# Patient Record
Sex: MALE | Race: WHITE | NOT HISPANIC OR LATINO | Employment: OTHER | URBAN - METROPOLITAN AREA
[De-identification: names, ages, dates, MRNs, and addresses within clinical notes are randomized per-mention and may not be internally consistent; named-entity substitution may affect disease eponyms.]

---

## 2022-10-22 ENCOUNTER — HOSPITAL ENCOUNTER (INPATIENT)
Facility: MEDICAL CENTER | Age: 87
LOS: 1 days | DRG: 291 | End: 2022-10-24
Attending: EMERGENCY MEDICINE | Admitting: STUDENT IN AN ORGANIZED HEALTH CARE EDUCATION/TRAINING PROGRAM
Payer: MEDICARE

## 2022-10-22 ENCOUNTER — APPOINTMENT (OUTPATIENT)
Dept: RADIOLOGY | Facility: MEDICAL CENTER | Age: 87
DRG: 291 | End: 2022-10-22
Attending: EMERGENCY MEDICINE
Payer: MEDICARE

## 2022-10-22 DIAGNOSIS — I50.23 SYSTOLIC CHF, ACUTE ON CHRONIC (HCC): ICD-10-CM

## 2022-10-22 DIAGNOSIS — R06.00 PND (PAROXYSMAL NOCTURNAL DYSPNEA): ICD-10-CM

## 2022-10-22 DIAGNOSIS — R06.01 ORTHOPNEA: ICD-10-CM

## 2022-10-22 DIAGNOSIS — R06.02 SOB (SHORTNESS OF BREATH): ICD-10-CM

## 2022-10-22 PROBLEM — I48.91 A-FIB (HCC): Status: ACTIVE | Noted: 2022-10-22

## 2022-10-22 PROBLEM — N17.9 AKI (ACUTE KIDNEY INJURY) (HCC): Status: ACTIVE | Noted: 2022-10-22

## 2022-10-22 PROBLEM — Z95.0 PACEMAKER: Status: ACTIVE | Noted: 2022-10-22

## 2022-10-22 PROBLEM — R79.89 TROPONIN I ABOVE REFERENCE RANGE: Status: ACTIVE | Noted: 2022-10-22

## 2022-10-22 PROBLEM — J96.01 ACUTE RESPIRATORY FAILURE WITH HYPOXIA (HCC): Status: ACTIVE | Noted: 2022-10-22

## 2022-10-22 LAB
ANION GAP SERPL CALC-SCNC: 16 MMOL/L (ref 7–16)
BASOPHILS # BLD AUTO: 0.9 % (ref 0–1.8)
BASOPHILS # BLD: 0.07 K/UL (ref 0–0.12)
BUN SERPL-MCNC: 24 MG/DL (ref 8–22)
CALCIUM SERPL-MCNC: 9.1 MG/DL (ref 8.5–10.5)
CHLORIDE SERPL-SCNC: 103 MMOL/L (ref 96–112)
CO2 SERPL-SCNC: 20 MMOL/L (ref 20–33)
CREAT SERPL-MCNC: 1.2 MG/DL (ref 0.5–1.4)
EKG IMPRESSION: NORMAL
EOSINOPHIL # BLD AUTO: 0.11 K/UL (ref 0–0.51)
EOSINOPHIL NFR BLD: 1.5 % (ref 0–6.9)
ERYTHROCYTE [DISTWIDTH] IN BLOOD BY AUTOMATED COUNT: 53 FL (ref 35.9–50)
GFR SERPLBLD CREATININE-BSD FMLA CKD-EPI: 58 ML/MIN/1.73 M 2
GLUCOSE BLD STRIP.AUTO-MCNC: 122 MG/DL (ref 65–99)
GLUCOSE SERPL-MCNC: 133 MG/DL (ref 65–99)
HCT VFR BLD AUTO: 45.5 % (ref 42–52)
HGB BLD-MCNC: 15.4 G/DL (ref 14–18)
IMM GRANULOCYTES # BLD AUTO: 0.02 K/UL (ref 0–0.11)
IMM GRANULOCYTES NFR BLD AUTO: 0.3 % (ref 0–0.9)
LYMPHOCYTES # BLD AUTO: 1.14 K/UL (ref 1–4.8)
LYMPHOCYTES NFR BLD: 15.4 % (ref 22–41)
MCH RBC QN AUTO: 32 PG (ref 27–33)
MCHC RBC AUTO-ENTMCNC: 33.8 G/DL (ref 33.7–35.3)
MCV RBC AUTO: 94.4 FL (ref 81.4–97.8)
MONOCYTES # BLD AUTO: 0.93 K/UL (ref 0–0.85)
MONOCYTES NFR BLD AUTO: 12.6 % (ref 0–13.4)
NEUTROPHILS # BLD AUTO: 5.14 K/UL (ref 1.82–7.42)
NEUTROPHILS NFR BLD: 69.3 % (ref 44–72)
NRBC # BLD AUTO: 0 K/UL
NRBC BLD-RTO: 0 /100 WBC
NT-PROBNP SERPL IA-MCNC: 4667 PG/ML (ref 0–125)
PLATELET # BLD AUTO: 250 K/UL (ref 164–446)
PMV BLD AUTO: 10.2 FL (ref 9–12.9)
POTASSIUM SERPL-SCNC: 4 MMOL/L (ref 3.6–5.5)
RBC # BLD AUTO: 4.82 M/UL (ref 4.7–6.1)
SODIUM SERPL-SCNC: 139 MMOL/L (ref 135–145)
TROPONIN T SERPL-MCNC: 40 NG/L (ref 6–19)
TROPONIN T SERPL-MCNC: 44 NG/L (ref 6–19)
WBC # BLD AUTO: 7.4 K/UL (ref 4.8–10.8)

## 2022-10-22 PROCEDURE — 99285 EMERGENCY DEPT VISIT HI MDM: CPT

## 2022-10-22 PROCEDURE — 84484 ASSAY OF TROPONIN QUANT: CPT

## 2022-10-22 PROCEDURE — 83880 ASSAY OF NATRIURETIC PEPTIDE: CPT

## 2022-10-22 PROCEDURE — A9270 NON-COVERED ITEM OR SERVICE: HCPCS | Performed by: EMERGENCY MEDICINE

## 2022-10-22 PROCEDURE — 71045 X-RAY EXAM CHEST 1 VIEW: CPT

## 2022-10-22 PROCEDURE — G0378 HOSPITAL OBSERVATION PER HR: HCPCS

## 2022-10-22 PROCEDURE — 99220 PR INITIAL OBSERVATION CARE,LEVL III: CPT | Performed by: STUDENT IN AN ORGANIZED HEALTH CARE EDUCATION/TRAINING PROGRAM

## 2022-10-22 PROCEDURE — 85025 COMPLETE CBC W/AUTO DIFF WBC: CPT

## 2022-10-22 PROCEDURE — 700102 HCHG RX REV CODE 250 W/ 637 OVERRIDE(OP): Performed by: STUDENT IN AN ORGANIZED HEALTH CARE EDUCATION/TRAINING PROGRAM

## 2022-10-22 PROCEDURE — 36415 COLL VENOUS BLD VENIPUNCTURE: CPT

## 2022-10-22 PROCEDURE — 80048 BASIC METABOLIC PNL TOTAL CA: CPT

## 2022-10-22 PROCEDURE — 93005 ELECTROCARDIOGRAM TRACING: CPT | Performed by: EMERGENCY MEDICINE

## 2022-10-22 PROCEDURE — 700102 HCHG RX REV CODE 250 W/ 637 OVERRIDE(OP): Performed by: EMERGENCY MEDICINE

## 2022-10-22 PROCEDURE — A9270 NON-COVERED ITEM OR SERVICE: HCPCS | Performed by: STUDENT IN AN ORGANIZED HEALTH CARE EDUCATION/TRAINING PROGRAM

## 2022-10-22 PROCEDURE — 82962 GLUCOSE BLOOD TEST: CPT

## 2022-10-22 PROCEDURE — 93005 ELECTROCARDIOGRAM TRACING: CPT | Performed by: STUDENT IN AN ORGANIZED HEALTH CARE EDUCATION/TRAINING PROGRAM

## 2022-10-22 RX ORDER — BISACODYL 10 MG
10 SUPPOSITORY, RECTAL RECTAL
Status: DISCONTINUED | OUTPATIENT
Start: 2022-10-22 | End: 2022-10-24 | Stop reason: HOSPADM

## 2022-10-22 RX ORDER — POLYETHYLENE GLYCOL 3350 17 G/17G
1 POWDER, FOR SOLUTION ORAL
Status: DISCONTINUED | OUTPATIENT
Start: 2022-10-22 | End: 2022-10-24 | Stop reason: HOSPADM

## 2022-10-22 RX ORDER — LABETALOL HYDROCHLORIDE 5 MG/ML
10 INJECTION, SOLUTION INTRAVENOUS EVERY 4 HOURS PRN
Status: DISCONTINUED | OUTPATIENT
Start: 2022-10-22 | End: 2022-10-24 | Stop reason: HOSPADM

## 2022-10-22 RX ORDER — POTASSIUM CHLORIDE 20 MEQ/1
20 TABLET, EXTENDED RELEASE ORAL DAILY
COMMUNITY

## 2022-10-22 RX ORDER — OCTREOTIDE ACETATE,MI-SPHERES 30 MG
30 VIAL (EA) INTRAMUSCULAR ONCE
COMMUNITY

## 2022-10-22 RX ORDER — ASPIRIN 325 MG
325 TABLET ORAL ONCE
Status: COMPLETED | OUTPATIENT
Start: 2022-10-22 | End: 2022-10-22

## 2022-10-22 RX ORDER — DEXTROSE MONOHYDRATE 25 G/50ML
25 INJECTION, SOLUTION INTRAVENOUS
Status: DISCONTINUED | OUTPATIENT
Start: 2022-10-22 | End: 2022-10-24 | Stop reason: HOSPADM

## 2022-10-22 RX ORDER — SODIUM PHOSPHATE,MONO-DIBASIC 19G-7G/118
500 ENEMA (ML) RECTAL
Status: SHIPPED | COMMUNITY
End: 2022-10-22

## 2022-10-22 RX ORDER — FUROSEMIDE 10 MG/ML
40 INJECTION INTRAMUSCULAR; INTRAVENOUS
Status: DISCONTINUED | OUTPATIENT
Start: 2022-10-22 | End: 2022-10-24

## 2022-10-22 RX ORDER — AMOXICILLIN 250 MG
2 CAPSULE ORAL 2 TIMES DAILY
Status: DISCONTINUED | OUTPATIENT
Start: 2022-10-22 | End: 2022-10-24 | Stop reason: HOSPADM

## 2022-10-22 RX ORDER — FUROSEMIDE 20 MG/1
20 TABLET ORAL DAILY
COMMUNITY

## 2022-10-22 RX ADMIN — ASPIRIN 325 MG: 325 TABLET, FILM COATED ORAL at 10:01

## 2022-10-22 RX ADMIN — APIXABAN 5 MG: 5 TABLET, FILM COATED ORAL at 19:03

## 2022-10-22 ASSESSMENT — PAIN DESCRIPTION - PAIN TYPE
TYPE: ACUTE PAIN
TYPE: ACUTE PAIN

## 2022-10-22 ASSESSMENT — CHA2DS2 SCORE
AGE 65 TO 74: NO
PRIOR STROKE OR TIA OR THROMBOEMBOLISM: NO
HYPERTENSION: YES
VASCULAR DISEASE: YES
DIABETES: NO
CHF OR LEFT VENTRICULAR DYSFUNCTION: YES
AGE 75 OR GREATER: YES
SEX: MALE
CHA2DS2 VASC SCORE: 5

## 2022-10-22 ASSESSMENT — LIFESTYLE VARIABLES
DOES PATIENT WANT TO STOP DRINKING: NO
EVER HAD A DRINK FIRST THING IN THE MORNING TO STEADY YOUR NERVES TO GET RID OF A HANGOVER: NO
ALCOHOL_USE: NO
TOTAL SCORE: 0
HAVE YOU EVER FELT YOU SHOULD CUT DOWN ON YOUR DRINKING: NO
ON A TYPICAL DAY WHEN YOU DRINK ALCOHOL HOW MANY DRINKS DO YOU HAVE: 0
HAVE PEOPLE ANNOYED YOU BY CRITICIZING YOUR DRINKING: NO
TOTAL SCORE: 0
TOTAL SCORE: 0
EVER HAD A DRINK FIRST THING IN THE MORNING TO STEADY YOUR NERVES TO GET RID OF A HANGOVER: NO
ALCOHOL_USE: NO
TOTAL SCORE: 0
ON A TYPICAL DAY WHEN YOU DRINK ALCOHOL HOW MANY DRINKS DO YOU HAVE: 0
HAVE PEOPLE ANNOYED YOU BY CRITICIZING YOUR DRINKING: NO
TOTAL SCORE: 0
CONSUMPTION TOTAL: NEGATIVE
AVERAGE NUMBER OF DAYS PER WEEK YOU HAVE A DRINK CONTAINING ALCOHOL: 0
HAVE YOU EVER FELT YOU SHOULD CUT DOWN ON YOUR DRINKING: NO
DOES PATIENT WANT TO STOP DRINKING: NO
DO YOU DRINK ALCOHOL: NO
HOW MANY TIMES IN THE PAST YEAR HAVE YOU HAD 5 OR MORE DRINKS IN A DAY: 0
EVER FELT BAD OR GUILTY ABOUT YOUR DRINKING: NO
CONSUMPTION TOTAL: INCOMPLETE
TOTAL SCORE: 0
EVER FELT BAD OR GUILTY ABOUT YOUR DRINKING: NO

## 2022-10-22 ASSESSMENT — PATIENT HEALTH QUESTIONNAIRE - PHQ9
SUM OF ALL RESPONSES TO PHQ9 QUESTIONS 1 AND 2: 0
2. FEELING DOWN, DEPRESSED, IRRITABLE, OR HOPELESS: NOT AT ALL
1. LITTLE INTEREST OR PLEASURE IN DOING THINGS: NOT AT ALL

## 2022-10-22 ASSESSMENT — ENCOUNTER SYMPTOMS
FEVER: 0
BLURRED VISION: 0
NAUSEA: 0
BRUISES/BLEEDS EASILY: 0
MYALGIAS: 0
DIZZINESS: 0
VOMITING: 0
ORTHOPNEA: 1
SHORTNESS OF BREATH: 1

## 2022-10-22 NOTE — ED TRIAGE NOTES
Chief Complaint   Patient presents with    Shortness of Breath     Patient seen at [place] for SOB. Patient reports he was visiting [place] and believes the high altitude contributed to his CHF exacerbation    Sent by MD     Patient transferred from [place] for higher level of care. Patient has hx of CHF with EF 36%, history of severe mitral valve regurgitation, and pacemaker for third degree heart block.     87 yo male bib EMS from [place] for above. Patient states he was visiting [place] from [facility], [place]. When patient got to [place], he began experiencing worsening SOB, BLE edema, and chest pressure. Patient seen and evaluated at [place], found to have elevated troponin and BNP. Sent here for higher level of care.    Patient given 20mg IV lasix at OSH, and reports approx 600mL urine output before arrival. CTA at [place] negative for PE.    Patient currently denies pain, VSS. States SOB is better when sitting up in bed.    /87   Pulse 67   Temp 36.4 °C (97.6 °F) (Temporal)   Resp 16   Ht 1.829 m (6')   Wt 79.4 kg (175 lb)   SpO2 96%   BMI 23.73 kg/m²

## 2022-10-22 NOTE — ED PROVIDER NOTES
ED Provider Note    CHIEF COMPLAINT  Chief Complaint   Patient presents with    Shortness of Breath     Patient seen at [facility] for SOB. Patient reports he was visiting [place] and believes the high altitude contributed to his CHF exacerbation    Sent by MD     Patient transferred from [facility] for higher level of care. Patient has hx of CHF with EF 36%, history of severe mitral valve regurgitation, and pacemaker for third degree heart block.     Patient has a significant hearing deficit and no hearing aids  Patient is unable to see them I written words when asking questions    HPI  Sergei Alfaro is a 88 y.o. male with metastatic islet cell cancer, history of A. fib on Eliquis and third-degree heart block status post pacemaker placement, severe mitral regurgitation, and CHF with an EF of 36% who presents complaining of shortness of breath.    Patient was transferred from [facility] for cardiology and pacemaker interrogation.    Patient reports acute onset of shortness of breath last evening at 730.  He was staying in a small cabin with multiple family members and thought he may be feeling claustrophobic.  He went outside and felt slightly better.  He came back in and went to bed and a couple of hours later he woke up gasping for air.  He sat up and leaned against the wall which made him feel slightly better.  He then developed left-sided chest pressure and went to [facility] for evaluation.    Per report, the patient is in pulmonary edema and had an elevated D-dimer.  Patient was given Lasix 20 mg IV.      ALLERGIES  No Known Allergies    CURRENT MEDICATIONS  Home Medications       Reviewed by Faith Doe R.N. (Registered Nurse) on 10/22/22 at 0902  Med List Status: Partial     Medication Last Dose Status   apixaban (ELIQUIS) 5mg Tab  Active   furosemide (LASIX) 20 MG Tab  Active   glucosamine Sulfate 500 MG Cap  Active   octreotide acetate lar (SANDOSTATIN LAR DEPOT) 30 MG Kit  Active                     PAST MEDICAL HISTORY   has a past medical history of Atrial fibrillation (HCC), Cancer (HCC), Congestive heart failure (HCC), Mitral valve regurgitation, and Pacemaker.    SURGICAL HISTORY  patient denies any surgical history    SOCIAL HISTORY  Social History     Tobacco Use    Smoking status: Never    Smokeless tobacco: Never   Vaping Use    Vaping Use: Never used   Substance and Sexual Activity    Alcohol use: Never    Drug use: Never    Sexual activity: Not on file       Family Hx:  Hypertension      REVIEW OF SYSTEMS  Patient admits to shortness of breath, orthopnea, PND  Full review of systems is unable to be obtained given patient's severe hearing deficit    PHYSICAL EXAM  VITAL SIGNS: /87   Pulse 69   Temp 36.4 °C (97.6 °F) (Temporal)   Resp 13   Ht 1.829 m (6')   Wt 79.4 kg (175 lb)   SpO2 95%   BMI 23.73 kg/m²     General:  WDWN elderly male, nontoxic appearing in NAD; A+Ox3; V/S as above  Skin: warm and dry; good color; no rash  HEENT: NCAT; EOMs intact; no scleral icterus   Neck: FROM; no JVD  Cardiovascular: Regular heart rate and irregular rhythm.  No murmurs, rubs, or gallops; pulses 2+ bilaterally radially and DP areas  Lungs: Clear to auscultation with good air movement bilaterally.  No wheezes, rhonchi, or rales.   Abdomen: BS present; soft; NTND; no rebound, guarding, or rigidity.  No organomegaly or pulsatile mass  Extremities: OSORIO x 4; no e/o trauma; no pedal edema; neg Jacobo's  Neurologic: CNs III-XII grossly intact except for hearing deficit; speech clear; distal sensation intact; strength 5/5 UE/LEs  Psychiatric: Appropriate affect, normal mood    LABS  Results for orders placed or performed during the hospital encounter of 10/22/22   TROPONIN   Result Value Ref Range    Troponin T 44 (H) 6 - 19 ng/L   EKG   Result Value Ref Range    Report       [facility] Emergency Dept.    Test Date:  2022-10-22  Pt Name:    JULIANE CLARK             Department:  ER  MRN:        [id number]                      Room:       RD 12  Gender:     Male                         Technician: [id number]  :        1934                   Requested By:ER TRIAGE PROTOCOL  Order #:    [id number]                    Reading MD: UMESH CARR MD    Measurements  Intervals                                Axis  Rate:       70                           P:  AR:                                      QRS:        -89  QRSD:       158                          T:          94  QT:         494  QTc:        534    Interpretive Statements  Afib/flut and V-paced complexes  No further analysis attempted due to paced rhythm  No previous ECG available for comparison  Electronically Signed On 10- 9:46:12 PDT by UMESH CARR MD         DX-CHEST-PORTABLE (1 VIEW)   Final Result      Cardiomegaly. No focal consolidation or pleural effusions.            MEDICAL RECORD  I have reviewed patient's medical record and pertinent results are listed below.    Medical records/results from the outside facility were reviewed.    CT demonstrated no PE, mild pulmonary edema, pulmonary arterial enlargement.  No aortic dissection.  Thyroid nodule noted.  Troponin negative.    D-dimer 4000  Glucose 135, BUN 31, creatinine 1.38, total bilirubin 1.2, BNP 4356, troponin 41.3 and 43.8      COURSE & MEDICAL DECISION MAKING  I have reviewed any medical record information, laboratory studies and radiographic results as noted.    Sergei Alfaro is a 88 y.o. male who presents for evaluation from an outside facility for CHF exacerbation.  Patient presented there for shortness of breath overnight and had an elevated D-dimer and BNP.  Unclear what his baseline is for these values are.  CT PE study was negative for PE.  Patient is typically seen at [facility] for cardiology but no beds were available there.    Patient is in no acute distress on my evaluation.  He is not in florid pulmonary edema at this time.    Troponin, EKG  and pacemaker interrogation ordered.    EKG here demonstrates paced rhythm    Troponin 44    Chest x-ray demonstrates cardiomegaly with no overt pulmonary edema.    Aspirin ordered.    Appropriate PPE was worn at all times while interacting with the patient.    11:25 AM  I discussed the case with the hospitalist who agrees to hospitalize the patient for further monitoring in the CDU.    IMPRESSION  1. SOB (shortness of breath)        2. Orthopnea        3. PND (paroxysmal nocturnal dyspnea)                 Electronically signed by: Sue Perla M.D., 10/22/2022 9:33 AM

## 2022-10-22 NOTE — ASSESSMENT & PLAN NOTE
-Admit to telemetry unit  -Lasix  Iv 40mg daily ,   -Supplement O2 as needed  -Fluid restriction     -Monitor Intake/Output, Daily Weights  -ECHO rule out LVD-results pending

## 2022-10-22 NOTE — ED NOTES
Med rec completed per patient at bedside.     Patient has NKDA.    No abx in the last 30 days.    Home pharmacy is [facility] in [place], [place]

## 2022-10-22 NOTE — H&P
Hospital Medicine History & Physical Note    Date of Service  10/22/2022            Code Status  Full Code    Chief Complaint  Chief Complaint   Patient presents with    Shortness of Breath     Patient seen at [place] for SOB. Patient reports he was visiting [place] and believes the high altitude contributed to his CHF exacerbation    Sent by MD     Patient transferred from [place] for higher level of care. Patient has hx of CHF with EF 36%, history of severe mitral valve regurgitation, and pacemaker for third degree heart block.       History of Presenting Illness  Sergei Alfaro is a 88 y.o. 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    Patient reports of visiting to ED earlier today due to sudden onset of worsening shortness of breath associated with orthopnea.  He denies chest pain, nausea, vomiting, cough, fever.  Denies any weakness/numbness.  He do reports exertional dyspnea and shortness of breath going on over 4 months for now.     Reports he is closely following his cardiology and having pacemaker interrogation every 3-month.    CT images noted with no PE, mild pulmonary edema.  Labs reviewed remarkable for elevated D-dimer 4000, elevated BUN/creatinine 31/1.38, BNP 4356.    Patient seen and examined at bedside.  Currently not in acute distress.  No overt sign of volume overload.  1+ lower extremity edema.  Repeat troponin 44, EKG noted with MARIA T zaragoza with no significant ST changes, prolonged QTC.  Chest x-ray unremarkable.      Patient admitted for acute CHF exacerbation requiring repeat echo and close electrolyte monitoring            I discussed the plan of care with patient.    Review of Systems    Patient is hard of hearing    Review of Systems   Constitutional:   Negative for fever.   HENT:  Negative for hearing loss.    Eyes:  Negative for blurred vision.   Respiratory:  Positive for shortness of breath.    Cardiovascular:  Positive for orthopnea.   Gastrointestinal:  Negative for nausea and vomiting.   Genitourinary:  Negative for dysuria.   Musculoskeletal:  Negative for myalgias.   Skin:  Negative for rash.   Neurological:  Negative for dizziness.   Endo/Heme/Allergies:  Does not bruise/bleed easily.     Past Medical History   has a past medical history of Atrial fibrillation (HCC), Cancer (HCC), Congestive heart failure (HCC), Mitral valve regurgitation, and Pacemaker.    Surgical History  Reviwed. AICD placement    Family History    Family history reviewed with patient. There is no family history that is pertinent to the chief complaint.     Social History   reports that he has never smoked. He has never used smokeless tobacco. He reports that he does not drink alcohol and does not use drugs.    Allergies  No Known Allergies    Medications  Prior to Admission Medications   Prescriptions Last Dose Informant Patient Reported? Taking?   Glucosamine-Chondroitin (MOVE FREE PO) 10/21/2022 at AM Patient Yes Yes   Sig: Take 1 Tablet by mouth every day.   Multiple Vitamins-Minerals (PRESERVISION AREDS 2 PO) 10/21/2022 at PM Patient Yes Yes   Sig: Take 1 Tablet by mouth 2 times a day.   VITAMIN D PO 10/21/2022 at AM Patient Yes Yes   Sig: Take 1 Tablet by mouth every day.   apixaban (ELIQUIS) 5mg Tab 10/21/2022 at PM Patient Yes Yes   Sig: Take 5 mg by mouth 2 times a day.   furosemide (LASIX) 20 MG Tab 10/22/2022 at AM Patient Yes Yes   Sig: Take 20 mg by mouth every day.   multivitamin (THERAGRAN) Tab 10/21/2022 at AM Patient Yes Yes   Sig: Take 1 Tablet by mouth every day.   octreotide acetate lar (SANDOSTATIN LAR DEPOT) 30 MG Kit 10/20/2022 at [facility] Patient Yes Yes   Sig: Inject 30 mg into the shoulder, thigh, or buttocks one time. Every 4 weeks   potassium chloride SA (KDUR)  20 MEQ Tab CR 10/21/2022 at AM Patient Yes Yes   Sig: Take 20 mEq by mouth every day.      Facility-Administered Medications: None       Physical Exam  Temp:  [36.4 °C (97.6 °F)] 36.4 °C (97.6 °F)  Pulse:  [67-69] 69  Resp:  [13-16] 13  BP: (134-143)/(73-87) 143/73  SpO2:  [95 %-97 %] 97 %  Blood Pressure : (!) 143/73   Temperature: 36.4 °C (97.6 °F)   Pulse: 69   Respiration: 13   Pulse Oximetry: 97 %       Physical Exam  Constitutional:       General: He is not in acute distress.  HENT:      Head: Normocephalic and atraumatic.      Comments: Hard of hearing     Right Ear: Tympanic membrane normal.      Left Ear: Tympanic membrane normal.      Nose: Nose normal.      Mouth/Throat:      Mouth: Mucous membranes are moist.   Eyes:      Extraocular Movements: Extraocular movements intact.      Pupils: Pupils are equal, round, and reactive to light.   Cardiovascular:      Rate and Rhythm: Normal rate and regular rhythm.      Pulses: Normal pulses.      Comments: Minimal B/L rales   Pulmonary:      Effort: Pulmonary effort is normal. No respiratory distress.      Breath sounds: No rales.   Abdominal:      General: Abdomen is flat. There is no distension.   Musculoskeletal:      Cervical back: Normal range of motion.      Right lower leg: Edema present.      Left lower leg: Edema present.   Skin:     General: Skin is warm.   Neurological:      General: No focal deficit present.      Mental Status: He is alert and oriented to person, place, and time. Mental status is at baseline.   Psychiatric:         Mood and Affect: Mood normal.       Laboratory:          No results for input(s): ALTSGPT, ASTSGOT, ALKPHOSPHAT, TBILIRUBIN, DBILIRUBIN, GAMMAGT, AMYLASE, LIPASE, ALB, PREALBUMIN, GLUCOSE in the last 72 hours.      No results for input(s): NTPROBNP in the last 72 hours.      Recent Labs     10/22/22  1004   TROPONINT 44*       Imaging:  DX-CHEST-PORTABLE (1 VIEW)   Final Result      Cardiomegaly. No focal consolidation or  pleural effusions.      EC-ECHOCARDIOGRAM COMPLETE W/ CONT    (Results Pending)       X-Ray:  I have personally reviewed the images and compared with prior images.  EKG:  I have personally reviewed the images and compared with prior images.    Assessment/Plan:  Justification for Admission Status  I anticipate this patient is appropriate for observation status at this time because  acute CHF exacerbation requiring repeat echo and close electrolyte monitoring        * Systolic CHF, acute on chronic (HCC)- (present on admission)  Assessment & Plan    -Admit to telemetry unit  -Lasix 40mg daily , monitor potassium and magnesium level ed  -Supplement O2 as needed  -Fluid restriction   -Serial troponin/CK-MB/EKG  -Monitor Intake/Output, Daily Weights  -ECHO rule out LVD      Acute respiratory failure with hypoxia ([facility])  Assessment & Plan  Respiratory failure in setting of volume overload  Continue to  monitor oxygen saturation closely  On Lasix  Incentive spirometry  Ambulatory O2 evaluation in a.m.      CA (acute kidney injury) ([facility])  Assessment & Plan  Avoid nephrotoxins, monitor inputs and outputs.  Baseline unknown  Hold IV fluid given concern of volume overload  Monitor for now            Pacemaker  Assessment & Plan  Status post pacemaker 10-year ago  Visit cardiology every 3 months for pacemaker interrogation      A-fib ([facility])  Assessment & Plan  On Eliquis at home    Troponin I above reference range  Assessment & Plan  Elevated troponin likely in setting of demand ischemia and due to underlying CA, EKG unremarkable  Unlikely ACS  Denies chest pain, shortness of breath  Continue to trend troponin  Telemetry monitoring, monitor EKG  Get echocardiogram to evaluate wall motion abnormalities          VTE prophylaxis: SCDs/TEDs and pharmacologic prophylaxis contraindicated due to eliquis

## 2022-10-22 NOTE — ASSESSMENT & PLAN NOTE
Elevated troponin likely in setting of demand ischemia and due to underlying CA, EKG unremarkable  Unlikely ACS    Troponin flat at the low 40s      Denies chest pain, shortness of breath  Continue to trend troponin  Telemetry monitoring, monitor EKG

## 2022-10-22 NOTE — ASSESSMENT & PLAN NOTE
Respiratory failure in setting of volume overload  Off of oxygen as of 10/23/2022  On iv Lasix  Incentive spirometry

## 2022-10-22 NOTE — ASSESSMENT & PLAN NOTE
Avoid nephrotoxins, monitor inputs and outputs.  Baseline unknown  Hold IV fluid given concern of volume overload  Monitor for now

## 2022-10-23 ENCOUNTER — APPOINTMENT (OUTPATIENT)
Dept: CARDIOLOGY | Facility: MEDICAL CENTER | Age: 87
DRG: 291 | End: 2022-10-23
Attending: STUDENT IN AN ORGANIZED HEALTH CARE EDUCATION/TRAINING PROGRAM
Payer: MEDICARE

## 2022-10-23 PROBLEM — R06.00 DYSPNEA: Status: ACTIVE | Noted: 2022-10-23

## 2022-10-23 LAB
ANION GAP SERPL CALC-SCNC: 11 MMOL/L (ref 7–16)
BUN SERPL-MCNC: 24 MG/DL (ref 8–22)
CALCIUM SERPL-MCNC: 9.2 MG/DL (ref 8.5–10.5)
CHLORIDE SERPL-SCNC: 104 MMOL/L (ref 96–112)
CO2 SERPL-SCNC: 22 MMOL/L (ref 20–33)
CREAT SERPL-MCNC: 1.04 MG/DL (ref 0.5–1.4)
EKG IMPRESSION: NORMAL
EKG IMPRESSION: NORMAL
ERYTHROCYTE [DISTWIDTH] IN BLOOD BY AUTOMATED COUNT: 51.7 FL (ref 35.9–50)
GFR SERPLBLD CREATININE-BSD FMLA CKD-EPI: 69 ML/MIN/1.73 M 2
GLUCOSE BLD STRIP.AUTO-MCNC: 101 MG/DL (ref 65–99)
GLUCOSE BLD STRIP.AUTO-MCNC: 115 MG/DL (ref 65–99)
GLUCOSE BLD STRIP.AUTO-MCNC: 167 MG/DL (ref 65–99)
GLUCOSE SERPL-MCNC: 118 MG/DL (ref 65–99)
HCT VFR BLD AUTO: 45.7 % (ref 42–52)
HGB BLD-MCNC: 15.4 G/DL (ref 14–18)
LV EJECT FRACT  99904: 25
LV EJECT FRACT MOD 2C 99903: 37.3
MCH RBC QN AUTO: 31.9 PG (ref 27–33)
MCHC RBC AUTO-ENTMCNC: 33.7 G/DL (ref 33.7–35.3)
MCV RBC AUTO: 94.6 FL (ref 81.4–97.8)
PLATELET # BLD AUTO: 226 K/UL (ref 164–446)
PMV BLD AUTO: 9.4 FL (ref 9–12.9)
POTASSIUM SERPL-SCNC: 4.1 MMOL/L (ref 3.6–5.5)
RBC # BLD AUTO: 4.83 M/UL (ref 4.7–6.1)
SODIUM SERPL-SCNC: 137 MMOL/L (ref 135–145)
TROPONIN T SERPL-MCNC: 44 NG/L (ref 6–19)
WBC # BLD AUTO: 7.7 K/UL (ref 4.8–10.8)

## 2022-10-23 PROCEDURE — 93010 ELECTROCARDIOGRAM REPORT: CPT | Mod: 76 | Performed by: INTERNAL MEDICINE

## 2022-10-23 PROCEDURE — A9270 NON-COVERED ITEM OR SERVICE: HCPCS | Performed by: STUDENT IN AN ORGANIZED HEALTH CARE EDUCATION/TRAINING PROGRAM

## 2022-10-23 PROCEDURE — 93005 ELECTROCARDIOGRAM TRACING: CPT | Performed by: STUDENT IN AN ORGANIZED HEALTH CARE EDUCATION/TRAINING PROGRAM

## 2022-10-23 PROCEDURE — 36415 COLL VENOUS BLD VENIPUNCTURE: CPT

## 2022-10-23 PROCEDURE — 96374 THER/PROPH/DIAG INJ IV PUSH: CPT

## 2022-10-23 PROCEDURE — 80048 BASIC METABOLIC PNL TOTAL CA: CPT

## 2022-10-23 PROCEDURE — 700117 HCHG RX CONTRAST REV CODE 255: Performed by: STUDENT IN AN ORGANIZED HEALTH CARE EDUCATION/TRAINING PROGRAM

## 2022-10-23 PROCEDURE — 99232 SBSQ HOSP IP/OBS MODERATE 35: CPT | Performed by: HOSPITALIST

## 2022-10-23 PROCEDURE — 85027 COMPLETE CBC AUTOMATED: CPT

## 2022-10-23 PROCEDURE — 700111 HCHG RX REV CODE 636 W/ 250 OVERRIDE (IP): Performed by: STUDENT IN AN ORGANIZED HEALTH CARE EDUCATION/TRAINING PROGRAM

## 2022-10-23 PROCEDURE — 82962 GLUCOSE BLOOD TEST: CPT

## 2022-10-23 PROCEDURE — 770020 HCHG ROOM/CARE - TELE (206)

## 2022-10-23 PROCEDURE — 93306 TTE W/DOPPLER COMPLETE: CPT

## 2022-10-23 PROCEDURE — 84484 ASSAY OF TROPONIN QUANT: CPT

## 2022-10-23 PROCEDURE — 700102 HCHG RX REV CODE 250 W/ 637 OVERRIDE(OP): Performed by: STUDENT IN AN ORGANIZED HEALTH CARE EDUCATION/TRAINING PROGRAM

## 2022-10-23 PROCEDURE — 93306 TTE W/DOPPLER COMPLETE: CPT | Mod: 26 | Performed by: INTERNAL MEDICINE

## 2022-10-23 RX ADMIN — APIXABAN 5 MG: 5 TABLET, FILM COATED ORAL at 18:23

## 2022-10-23 RX ADMIN — HUMAN ALBUMIN MICROSPHERES AND PERFLUTREN 3 ML: 10; .22 INJECTION, SOLUTION INTRAVENOUS at 13:15

## 2022-10-23 RX ADMIN — FUROSEMIDE 40 MG: 10 INJECTION, SOLUTION INTRAMUSCULAR; INTRAVENOUS at 05:40

## 2022-10-23 RX ADMIN — INSULIN HUMAN 1 UNITS: 100 INJECTION, SOLUTION PARENTERAL at 18:23

## 2022-10-23 RX ADMIN — SENNOSIDES AND DOCUSATE SODIUM 2 TABLET: 50; 8.6 TABLET ORAL at 18:22

## 2022-10-23 RX ADMIN — APIXABAN 5 MG: 5 TABLET, FILM COATED ORAL at 05:40

## 2022-10-23 ASSESSMENT — ENCOUNTER SYMPTOMS
CHILLS: 0
PHOTOPHOBIA: 0
DIZZINESS: 0
PALPITATIONS: 0
VOMITING: 0
ABDOMINAL PAIN: 0
NECK PAIN: 0
HEARTBURN: 0
BACK PAIN: 0
TINGLING: 0
DOUBLE VISION: 0
BLURRED VISION: 0
FEVER: 0
COUGH: 0
HEADACHES: 0
SPUTUM PRODUCTION: 0
SHORTNESS OF BREATH: 1
HEMOPTYSIS: 0
PSYCHIATRIC NEGATIVE: 1
WEIGHT LOSS: 0
NAUSEA: 0
MYALGIAS: 0
SENSORY CHANGE: 0

## 2022-10-23 NOTE — HOSPITAL COURSE
"Sergei Alfaro is a 88 y.o. male with past medical history of A. 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  He went to the ED earlier due to sudden onset of worsening shortness of breath associated with orthopnea.  He denies chest pain, nausea, vomiting, cough, or fever.  He does report exertional dyspnea and shortness of breath for over 4 months now.  He closely follows with his cardiologist and has his pacemaker interrogated every 3 months.    Medical records and results from the outside facility were reviewed by the ED physician.  CT demonstrated no PE, however mild pulmonary edema and pulmonary arterial enlargement, with no aortic dissection.  Troponin negative.  D-dimer 4000, glucose 135, BUN 31, creatinine 1.38, total bili 1.2, BNP 4356, troponin 41.3 and 43.8.      In [facility] ED, EKG showed paced rhythm, troponin 44, chest x-ray with cardiomegaly and no overt pulmonary edema.  Aspirin was given to the patient and his pacemaker was interrogated.    Patient remained overnight for 2 midnights for management of his acute heart failure exacerbation.  Echocardiogram completed on 10/23 demonstrated severely reduced EF at 25%, alongside moderate to severe mitral regurgitation and moderate tricuspid regurgitation.  Patient was aggressively diuresed with 40 mg of IV Lasix daily with good result.  He was able to come off the oxygen and was satting 93% on room air.    Patient reported that he was told by his primary care provider that he was \"starting to be diabetic\" but had not been started on any medication.  His A1c was 6.4 here in the hospital.  I advised him to follow-up with his primary care provider for consideration of an SGLT2 for diabetes management and heart failure.    On the day of discharge, patient " seen and examined.  Reports feeling significant improvement in shortness of breath and reports just mild shortness of breath which is his baseline.  He has been started on goal-directed medical therapy for heart failure including losartan 25 mg daily, Metroprolol SR 25 mg daily, and spironolactone 25 mg daily.  He is being continued on his home dose of 20 mg daily Lasix twice daily Eliquis.    Plan of care discussed with patient and his family and all questions answered.

## 2022-10-23 NOTE — CARE PLAN
The patient is Stable - Low risk of patient condition declining or worsening         Progress made toward(s) clinical / shift goals:  Patient room close to nurses station, bed alarm in  place and functioning.       Problem: Fall Risk  Goal: Patient will remain free from falls  Outcome: Progressing     Problem: Knowledge Deficit - Standard  Goal: Patient and family/care givers will demonstrate understanding of plan of care, disease process/condition, diagnostic tests and medications  Outcome: Progressing       Patient is not progressing towards the following goals:

## 2022-10-23 NOTE — CARE PLAN
Assumed care of patient at shift change.  Patient AAO x 4, pleasant, on 2L NC with O2 sats in low- to mid-90s...will wean as tolerated. No c/o pain nor discomfort. Minor SOB with exertion.  Tele monitor showing dually paced.  No needs verbalized at this time.  Call bell and belongings within reach.  Will continue to monitor.          The patient is Stable - Low risk of patient condition declining or worsening    Shift Goals  Clinical Goals: Monitor tele, echo, diurese, wean O2  Patient Goals: Comfort  Family Goals: N/A    Progress made toward(s) clinical / shift goals:    Problem: Knowledge Deficit - Standard  Goal: Patient and family/care givers will demonstrate understanding of plan of care, disease process/condition, diagnostic tests and medications  10/23/2022 0903 by Trista Ascencio, R.N.  Outcome: Progressing  10/23/2022 0903 by Trista Ascencio, R.N.  Outcome: Progressing     Problem: Fall Risk  Goal: Patient will remain free from falls  10/23/2022 0903 by Trista Ascencio, R.N.  Outcome: Progressing  10/23/2022 0903 by Trista Ascencio, R.N.  Outcome: Progressing     Problem: Hemodynamics  Goal: Patient's hemodynamics, fluid balance and neurologic status will be stable or improve  Outcome: Progressing     Problem: Respiratory  Goal: Patient will achieve/maintain optimum respiratory ventilation and gas exchange  Outcome: Progressing     Problem: Fluid Volume  Goal: Fluid volume balance will be maintained  Outcome: Progressing       Patient is not progressing towards the following goals:

## 2022-10-23 NOTE — PROGRESS NOTES
Hospital Medicine Daily Progress Note    Date of Service  10/23/2022    Chief Complaint  Sergei Alfaro is a 88 y.o. male admitted 10/22/2022 with dysnea    Hospital Course  Sergei Alfaro is a 88 y.o. 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     Patient reports of visiting to ED earlier today due to sudden onset of worsening shortness of breath associated with orthopnea.  He denies chest pain, nausea, vomiting, cough, fever.  Denies any weakness/numbness.  He do reports exertional dyspnea and shortness of breath going on over 4 months for now.      Reports he is closely following his cardiology and having pacemaker interrogation every 3-month.     CT images noted with no PE, mild pulmonary edema.  Labs reviewed remarkable for elevated D-dimer 4000, elevated BUN/creatinine 31/1.38, BNP 4356.     Patient seen and examined at bedside.  Currently not in acute distress.  No overt sign of volume overload.  1+ lower extremity edema.  Repeat troponin 44, EKG noted with MARIA T zaragoza with no significant ST changes, prolonged QTC.  Chest x-ray unremarkable.    Interval Problem Update  Patient is very hard of hearing    Patient states that his shortness of breath is improved    Patient currently on IV Lasix for congestive heart failure    No chest pain     No fever or chills    Echocardiogram performed results not yet available    I have discussed this patient's plan of care and discharge plan at IDT rounds today with Case Management, Nursing, Nursing leadership, and other members of the IDT team.    Consultants/Specialty      Code Status  Full Code    Disposition  Patient is not medically cleared for discharge.   Anticipate discharge to to home with close outpatient follow-up.  I have placed  the appropriate orders for post-discharge needs.    Review of Systems  Review of Systems   Constitutional:  Negative for chills, fever, malaise/fatigue and weight loss.   HENT:  Negative for ear pain, hearing loss and tinnitus.    Eyes:  Negative for blurred vision, double vision and photophobia.   Respiratory:  Positive for shortness of breath. Negative for cough, hemoptysis and sputum production.    Cardiovascular:  Negative for chest pain and palpitations.   Gastrointestinal:  Negative for abdominal pain, heartburn, nausea and vomiting.   Genitourinary:  Negative for dysuria, frequency and urgency.   Musculoskeletal:  Negative for back pain, joint pain, myalgias and neck pain.   Neurological:  Negative for dizziness, tingling, sensory change and headaches.   Psychiatric/Behavioral: Negative.        Physical Exam  Temp:  [36.4 °C (97.5 °F)-36.7 °C (98 °F)] 36.4 °C (97.6 °F)  Pulse:  [49-72] 68  Resp:  [16-18] 16  BP: (100-155)/(56-89) 119/68  SpO2:  [93 %-97 %] 93 %    Physical Exam  Constitutional:       General: He is not in acute distress.     Appearance: He is not ill-appearing, toxic-appearing or diaphoretic.   HENT:      Head: Normocephalic.      Right Ear: There is no impacted cerumen.      Left Ear: There is no impacted cerumen.      Mouth/Throat:      Mouth: Mucous membranes are moist.   Eyes:      General: No scleral icterus.        Left eye: No discharge.      Extraocular Movements: Extraocular movements intact.      Pupils: Pupils are equal, round, and reactive to light.   Cardiovascular:      Rate and Rhythm: Normal rate and regular rhythm.      Heart sounds: No murmur heard.    No gallop.   Pulmonary:      Effort: No respiratory distress.      Breath sounds: No stridor. No wheezing or rhonchi.   Chest:      Chest wall: No tenderness.   Abdominal:      General: There is no distension.      Palpations: There is no mass.      Tenderness: There is no abdominal tenderness. There is no left CVA tenderness  or guarding.      Hernia: No hernia is present.   Musculoskeletal:         General: No swelling or deformity. Normal range of motion.      Cervical back: Normal range of motion.      Right lower leg: No edema.   Skin:     General: Skin is warm.      Coloration: Skin is not jaundiced.      Findings: No bruising.   Neurological:      General: No focal deficit present.      Mental Status: He is alert.      Cranial Nerves: No cranial nerve deficit.      Motor: No weakness.      Gait: Gait normal.      Comments: Hard of hearing   Psychiatric:         Mood and Affect: Mood normal.       Fluids    Intake/Output Summary (Last 24 hours) at 10/23/2022 1443  Last data filed at 10/23/2022 0752  Gross per 24 hour   Intake --   Output 1550 ml   Net -1550 ml       Laboratory  Recent Labs     10/22/22  1004 10/23/22  0244   WBC 7.4 7.7   RBC 4.82 4.83   HEMOGLOBIN 15.4 15.4   HEMATOCRIT 45.5 45.7   MCV 94.4 94.6   MCH 32.0 31.9   MCHC 33.8 33.7   RDW 53.0* 51.7*   PLATELETCT 250 226   MPV 10.2 9.4     Recent Labs     10/22/22  1004 10/23/22  0244   SODIUM 139 137   POTASSIUM 4.0 4.1   CHLORIDE 103 104   CO2 20 22   GLUCOSE 133* 118*   BUN 24* 24*   CREATININE 1.20 1.04   CALCIUM 9.1 9.2                   Imaging  EC-ECHOCARDIOGRAM COMPLETE W/O CONT         DX-CHEST-PORTABLE (1 VIEW)   Final Result      Cardiomegaly. No focal consolidation or pleural effusions.           Assessment/Plan  * Systolic CHF, acute on chronic (HCC)- (present on admission)  Assessment & Plan    -Admit to telemetry unit  -Lasix  Iv 40mg daily ,   -Supplement O2 as needed  -Fluid restriction     -Monitor Intake/Output, Daily Weights  -ECHO rule out LVD-results pending      Acute respiratory failure with hypoxia ([facility])- (present on admission)  Assessment & Plan  Respiratory failure in setting of volume overload  Off of oxygen as of 10/23/2022  On iv Lasix  Incentive spirometry        CA (acute kidney injury) ([facility])  Assessment & Plan  Avoid nephrotoxins,  monitor inputs and outputs.  Baseline unknown  Hold IV fluid given concern of volume overload  Monitor for now            Pacemaker- (present on admission)  Assessment & Plan  Status post pacemaker 10-year ago  Visit cardiology every 3 months for pacemaker interrogation      A-fib (HCC)- (present on admission)  Assessment & Plan  On Eliquis at home    Troponin I above reference range  Assessment & Plan  Elevated troponin likely in setting of demand ischemia and due to underlying CA, EKG unremarkable  Unlikely ACS    Troponin flat at the low 40s      Denies chest pain, shortness of breath  Continue to trend troponin  Telemetry monitoring, monitor EKG             VTE prophylaxis: SCDs/TEDs    I have performed a physical exam and reviewed and updated ROS and Plan today (10/23/2022). In review of yesterday's note (10/22/2022), there are no changes except as documented above.

## 2022-10-23 NOTE — CARE PLAN
The patient is Stable - Low risk of patient condition declining or worsening         Progress made toward(s) clinical / shift goals:  [place] to room and medical plan of care. Waiting for family to arrive.     Patient is not progressing towards the following goals:

## 2022-10-24 ENCOUNTER — PATIENT OUTREACH (OUTPATIENT)
Dept: SCHEDULING | Facility: IMAGING CENTER | Age: 87
End: 2022-10-24
Payer: MEDICARE

## 2022-10-24 ENCOUNTER — PHARMACY VISIT (OUTPATIENT)
Dept: PHARMACY | Facility: MEDICAL CENTER | Age: 87
End: 2022-10-24
Payer: COMMERCIAL

## 2022-10-24 VITALS
OXYGEN SATURATION: 95 % | BODY MASS INDEX: 22.63 KG/M2 | HEIGHT: 72 IN | RESPIRATION RATE: 16 BRPM | TEMPERATURE: 96.6 F | HEART RATE: 69 BPM | DIASTOLIC BLOOD PRESSURE: 65 MMHG | SYSTOLIC BLOOD PRESSURE: 122 MMHG | WEIGHT: 167.11 LBS

## 2022-10-24 PROBLEM — R79.89 TROPONIN I ABOVE REFERENCE RANGE: Status: RESOLVED | Noted: 2022-10-22 | Resolved: 2022-10-24

## 2022-10-24 PROBLEM — J96.01 ACUTE RESPIRATORY FAILURE WITH HYPOXIA (HCC): Status: RESOLVED | Noted: 2022-10-22 | Resolved: 2022-10-24

## 2022-10-24 PROBLEM — R06.00 DYSPNEA: Status: RESOLVED | Noted: 2022-10-23 | Resolved: 2022-10-24

## 2022-10-24 PROBLEM — N17.9 AKI (ACUTE KIDNEY INJURY) (HCC): Status: RESOLVED | Noted: 2022-10-22 | Resolved: 2022-10-24

## 2022-10-24 LAB
ANION GAP SERPL CALC-SCNC: 12 MMOL/L (ref 7–16)
BUN SERPL-MCNC: 24 MG/DL (ref 8–22)
CALCIUM SERPL-MCNC: 8.8 MG/DL (ref 8.5–10.5)
CHLORIDE SERPL-SCNC: 101 MMOL/L (ref 96–112)
CO2 SERPL-SCNC: 23 MMOL/L (ref 20–33)
CREAT SERPL-MCNC: 1.11 MG/DL (ref 0.5–1.4)
EKG IMPRESSION: NORMAL
EST. AVERAGE GLUCOSE BLD GHB EST-MCNC: 137 MG/DL
GFR SERPLBLD CREATININE-BSD FMLA CKD-EPI: 64 ML/MIN/1.73 M 2
GLUCOSE BLD STRIP.AUTO-MCNC: 122 MG/DL (ref 65–99)
GLUCOSE BLD STRIP.AUTO-MCNC: 92 MG/DL (ref 65–99)
GLUCOSE SERPL-MCNC: 114 MG/DL (ref 65–99)
HBA1C MFR BLD: 6.4 % (ref 4–5.6)
NT-PROBNP SERPL IA-MCNC: 1976 PG/ML (ref 0–125)
POTASSIUM SERPL-SCNC: 3.8 MMOL/L (ref 3.6–5.5)
SODIUM SERPL-SCNC: 136 MMOL/L (ref 135–145)

## 2022-10-24 PROCEDURE — 700111 HCHG RX REV CODE 636 W/ 250 OVERRIDE (IP): Performed by: STUDENT IN AN ORGANIZED HEALTH CARE EDUCATION/TRAINING PROGRAM

## 2022-10-24 PROCEDURE — 93005 ELECTROCARDIOGRAM TRACING: CPT | Performed by: STUDENT IN AN ORGANIZED HEALTH CARE EDUCATION/TRAINING PROGRAM

## 2022-10-24 PROCEDURE — 93010 ELECTROCARDIOGRAM REPORT: CPT | Performed by: INTERNAL MEDICINE

## 2022-10-24 PROCEDURE — RXMED WILLOW AMBULATORY MEDICATION CHARGE: Performed by: NURSE PRACTITIONER

## 2022-10-24 PROCEDURE — 90471 IMMUNIZATION ADMIN: CPT

## 2022-10-24 PROCEDURE — 83036 HEMOGLOBIN GLYCOSYLATED A1C: CPT

## 2022-10-24 PROCEDURE — 82962 GLUCOSE BLOOD TEST: CPT

## 2022-10-24 PROCEDURE — 700102 HCHG RX REV CODE 250 W/ 637 OVERRIDE(OP): Performed by: NURSE PRACTITIONER

## 2022-10-24 PROCEDURE — 90662 IIV NO PRSV INCREASED AG IM: CPT | Performed by: NURSE PRACTITIONER

## 2022-10-24 PROCEDURE — 700102 HCHG RX REV CODE 250 W/ 637 OVERRIDE(OP): Performed by: STUDENT IN AN ORGANIZED HEALTH CARE EDUCATION/TRAINING PROGRAM

## 2022-10-24 PROCEDURE — A9270 NON-COVERED ITEM OR SERVICE: HCPCS | Performed by: NURSE PRACTITIONER

## 2022-10-24 PROCEDURE — 3E02340 INTRODUCTION OF INFLUENZA VACCINE INTO MUSCLE, PERCUTANEOUS APPROACH: ICD-10-PCS | Performed by: STUDENT IN AN ORGANIZED HEALTH CARE EDUCATION/TRAINING PROGRAM

## 2022-10-24 PROCEDURE — 93010 ELECTROCARDIOGRAM REPORT: CPT | Mod: 76 | Performed by: INTERNAL MEDICINE

## 2022-10-24 PROCEDURE — 83880 ASSAY OF NATRIURETIC PEPTIDE: CPT

## 2022-10-24 PROCEDURE — 700111 HCHG RX REV CODE 636 W/ 250 OVERRIDE (IP): Performed by: NURSE PRACTITIONER

## 2022-10-24 PROCEDURE — 99239 HOSP IP/OBS DSCHRG MGMT >30: CPT | Performed by: NURSE PRACTITIONER

## 2022-10-24 PROCEDURE — A9270 NON-COVERED ITEM OR SERVICE: HCPCS | Performed by: STUDENT IN AN ORGANIZED HEALTH CARE EDUCATION/TRAINING PROGRAM

## 2022-10-24 PROCEDURE — 80048 BASIC METABOLIC PNL TOTAL CA: CPT

## 2022-10-24 RX ORDER — LOSARTAN POTASSIUM 50 MG/1
25 TABLET ORAL
Status: DISCONTINUED | OUTPATIENT
Start: 2022-10-24 | End: 2022-10-24 | Stop reason: HOSPADM

## 2022-10-24 RX ORDER — FUROSEMIDE 20 MG/1
20 TABLET ORAL
Status: DISCONTINUED | OUTPATIENT
Start: 2022-10-24 | End: 2022-10-24

## 2022-10-24 RX ORDER — FUROSEMIDE 20 MG/1
20 TABLET ORAL
Status: DISCONTINUED | OUTPATIENT
Start: 2022-10-24 | End: 2022-10-24 | Stop reason: HOSPADM

## 2022-10-24 RX ORDER — METOPROLOL SUCCINATE 25 MG/1
25 TABLET, EXTENDED RELEASE ORAL
Status: DISCONTINUED | OUTPATIENT
Start: 2022-10-24 | End: 2022-10-24 | Stop reason: HOSPADM

## 2022-10-24 RX ORDER — METOPROLOL SUCCINATE 25 MG/1
25 TABLET, EXTENDED RELEASE ORAL DAILY
Qty: 30 TABLET | Refills: 0 | Status: SHIPPED | OUTPATIENT
Start: 2022-10-25

## 2022-10-24 RX ORDER — LOSARTAN POTASSIUM 25 MG/1
25 TABLET ORAL DAILY
Qty: 30 TABLET | Refills: 0 | Status: SHIPPED | OUTPATIENT
Start: 2022-10-24

## 2022-10-24 RX ORDER — SPIRONOLACTONE 25 MG/1
25 TABLET ORAL
Status: DISCONTINUED | OUTPATIENT
Start: 2022-10-24 | End: 2022-10-24 | Stop reason: HOSPADM

## 2022-10-24 RX ORDER — SPIRONOLACTONE 25 MG/1
25 TABLET ORAL DAILY
Qty: 30 TABLET | Refills: 3 | Status: SHIPPED | OUTPATIENT
Start: 2022-10-25

## 2022-10-24 RX ADMIN — LOSARTAN POTASSIUM 25 MG: 50 TABLET, FILM COATED ORAL at 10:13

## 2022-10-24 RX ADMIN — APIXABAN 5 MG: 5 TABLET, FILM COATED ORAL at 05:43

## 2022-10-24 RX ADMIN — METOPROLOL SUCCINATE 25 MG: 25 TABLET, FILM COATED, EXTENDED RELEASE ORAL at 08:47

## 2022-10-24 RX ADMIN — FUROSEMIDE 40 MG: 10 INJECTION, SOLUTION INTRAMUSCULAR; INTRAVENOUS at 05:43

## 2022-10-24 RX ADMIN — SENNOSIDES AND DOCUSATE SODIUM 2 TABLET: 50; 8.6 TABLET ORAL at 05:43

## 2022-10-24 RX ADMIN — INFLUENZA A VIRUS A/VICTORIA/2570/2019 IVR-215 (H1N1) ANTIGEN (FORMALDEHYDE INACTIVATED), INFLUENZA A VIRUS A/DARWIN/9/2021 SAN-010 (H3N2) ANTIGEN (FORMALDEHYDE INACTIVATED), INFLUENZA B VIRUS B/PHUKET/3073/2013 ANTIGEN (FORMALDEHYDE INACTIVATED), AND INFLUENZA B VIRUS B/MICHIGAN/01/2021 ANTIGEN (FORMALDEHYDE INACTIVATED) 0.5 ML: 60; 60; 60; 60 INJECTION, SUSPENSION INTRAMUSCULAR at 10:34

## 2022-10-24 RX ADMIN — SPIRONOLACTONE 25 MG: 25 TABLET ORAL at 08:47

## 2022-10-24 NOTE — CARE PLAN
Assumed care of patient at shift change.  Patient AAO x 4, pleasant, very [place], blind, on RA with O2 sats in low- to mid-90s, no c/o pain nor discomfort.  Patient does have minor dyspnea on exertion.  Tele monitor showing NSR, dually paced.  No needs verbalized at this time.  Call bell and belongings within reach.  Will continue to monitor.          The patient is Stable - Low risk of patient condition declining or worsening    Shift Goals  Clinical Goals: Monitor tele/O2/labs/BP  Patient Goals: Rest  Family Goals: N/A    Progress made toward(s) clinical / shift goals:    Problem: Knowledge Deficit - Standard  Goal: Patient and family/care givers will demonstrate understanding of plan of care, disease process/condition, diagnostic tests and medications  Outcome: Progressing     Problem: Fall Risk  Goal: Patient will remain free from falls  Outcome: Progressing     Problem: Hemodynamics  Goal: Patient's hemodynamics, fluid balance and neurologic status will be stable or improve  Outcome: Progressing     Problem: Respiratory  Goal: Patient will achieve/maintain optimum respiratory ventilation and gas exchange  Outcome: Progressing     Problem: Fluid Volume  Goal: Fluid volume balance will be maintained  Outcome: Progressing       Patient is not progressing towards the following goals:

## 2022-10-24 NOTE — PROGRESS NOTES
Patient discharged to home with wife and son, Ke[place].    AVS, discharge instructions, and education reviewed with patient and family. All questions answered.    IV removed.    AVS, discharge instructions, belongings, and medication from [facility] taken with patient upon discharge.    Patient taken to son's car via wheelchair with VERITO Mckeon

## 2022-10-24 NOTE — HEART FAILURE PROGRAM
Special Clinical Considerations Pertinent to HF    Pneumococcal vaccine: previous  Influenza vaccine: missing, messaged pharmacist  Nicotine Status: never per h/p  Documentation of nicotine cessation counseling: n/a  DM dx: no  Atrial arrhythmia dx: yes, on apixaban  Indication for hydralazine dinitrate per facesheet: no  Referral to Cardiac Rehab, Patient Criteria: Stable, chronic heart failure patients who can receive Medicare coverage are defined as patients with left ventricular ejection fraction of 35% or less and New York Heart Association (NYHA) Class II to IV symptoms on optimal heart failure therapy for at least six weeks    Nurses: Please document HF education in the education tab and populate the new and improved HF Care Plan. These tools will guide day to day care of the HF patient.    Providers: below are Guideline Directed Medical Therapy (GDMT) for HFrEF. If any cannot be prescribed by discharge, would you please note the clinical reason for each in your discharge summary? Thanks! Rena  Evidence Based Beta Blocker (bisoprolol, carvedilol, or toprol xl), for EF of 40% or less  ILA - I, for EF of 40% or less ARNI is preferred If not cost prohibitive for patient  SGLT2 inhibitor with proven ASCVD, HF, or DKD benefit Jardiance/empagliflozin): If not cost prohibitive for patient  Aldosterone antagonist, for EF of 40% or less  Anticoagulation for atrial arrhythmia, if applicable  Glycemic control for DM + HF, if applicable  Lipid lowering medication for DM + HF, if applicable  Hydralazine Hydrochloride/Isosorbide Dinitrate. The combination of hydralazine and isosorbide- dinitrate is recommended to reduce morbidity and mortality for patients self-described  Americans with NYHA class III-IV HFrEF (EF 40% or less), receiving optimal therapy with ACE inhibitors and beta blockers, unless contraindicated (Class I, EAN: A).  Pneumococcal vaccine, if not previously received  Influenza vaccine for current  season, if not previously received  Nicotine cessation counseling documented, if applicable  Device screening, if applicable  Referral to disease management program specializing in heart failure care- requested that schedulers notify me if patient cannot be scheduled at the Heart Failure Clinic  Referral to Cardiac Rehab: Patient Criteria: Stable, chronic heart failure patients who can receive Medicare coverage are defined as patients with left ventricular ejection fraction of 35% or less and New York Heart Association (NYHA) Class II to IV symptoms on optimal heart failure therapy for at least six weeks.  7 calendar day f/u appointment scheduled prior to discharge, appearing on signed after visit summary.

## 2022-10-24 NOTE — DISCHARGE INSTRUCTIONS
You have been started on 3 new heart failure medications include called losartan, spironolactone, and metoprolol.  Make sure that you take your blood pressure and if your top number is below 100, do not take the medications.  If you feel dizzy or faint when starting these medications, please do not take them until you follow-up with your cardiologist.    Continue taking your Lasix and Eliquis as previously prescribed.    Make sure that you follow-up with your cardiologist for an appointment as soon as possible.  Return to the ED for chest pain, shortness of breath, difficulty breathing, or signs of bleeding.

## 2022-10-24 NOTE — DISCHARGE SUMMARY
Discharge Summary    CHIEF COMPLAINT ON ADMISSION  Chief Complaint   Patient presents with    Shortness of Breath     Patient seen at [place] for SOB. Patient reports he was visiting [place] and believes the high altitude contributed to his CHF exacerbation    Sent by MD     Patient transferred from [place] for higher level of care. Patient has hx of CHF with EF 36%, history of severe mitral valve regurgitation, and pacemaker for third degree heart block.       Reason for Admission  ems     Admission Date  10/22/2022    CODE STATUS  Full Code    HPI & HOSPITAL COURSE  Sergei Alfaro is a 88 y.o. male with past medical history of A. 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  He went to the ED earlier due to sudden onset of worsening shortness of breath associated with orthopnea.  He denies chest pain, nausea, vomiting, cough, or fever.  He does report exertional dyspnea and shortness of breath for over 4 months now.  He closely follows with his cardiologist and has his pacemaker interrogated every 3 months.    Medical records and results from the outside facility were reviewed by the ED physician.  CT demonstrated no PE, however mild pulmonary edema and pulmonary arterial enlargement, with no aortic dissection.  Troponin negative.  D-dimer 4000, glucose 135, BUN 31, creatinine 1.38, total bili 1.2, BNP 4356, troponin 41.3 and 43.8.      In [facility] ED, EKG showed paced rhythm, troponin 44, chest x-ray with cardiomegaly and no overt pulmonary edema.  Aspirin was given to the patient and his pacemaker was interrogated.    Patient remained overnight for 2 midnights for management of his acute heart failure exacerbation.  Echocardiogram completed on 10/23 demonstrated severely reduced EF at 25%, alongside moderate  "to severe mitral regurgitation and moderate tricuspid regurgitation.  Patient was aggressively diuresed with 40 mg of IV Lasix daily with good result.  He was able to come off the oxygen and was satting 93% on room air.    Patient reported that he was told by his primary care provider that he was \"starting to be diabetic\" but had not been started on any medication.  His A1c was 6.4 here in the hospital.  I advised him to follow-up with his primary care provider for consideration of an SGLT2 for diabetes management and heart failure.    On the day of discharge, patient seen and examined.  Reports feeling significant improvement in shortness of breath and reports just mild shortness of breath which is his baseline.  He has been started on goal-directed medical therapy for heart failure including losartan 25 mg daily, Metroprolol SR 25 mg daily, and spironolactone 25 mg daily.  He is being continued on his home dose of 20 mg daily Lasix twice daily Eliquis.    Plan of care discussed with patient and his family and all questions answered.    Therefore, he is discharged in good and stable condition to home with close outpatient follow-up.    The patient met 2-midnight criteria for an inpatient stay at the time of discharge.    Discharge Date  10/24/2022    FOLLOW UP ITEMS POST DISCHARGE  You have been started on 3 new heart failure medications include called losartan, spironolactone, and metoprolol.  Make sure that you take your blood pressure and if your top number is below 100, you do not take the medications.  If you feel dizzy or faint when starting these medications, please do not take them until you follow-up with your cardiologist.    Continue taking your Lasix and Eliquis as previously prescribed.    Make sure that you follow-up with your cardiologist for an appointment as soon as possible.  Return to the ED for chest pain, shortness of breath, difficulty breathing, or signs of bleeding.    DISCHARGE " DIAGNOSES  Principal Problem:    Systolic CHF, acute on chronic (HCC) POA: Yes  Active Problems:    A-fib (HCC) POA: Yes    Pacemaker POA: Yes  Resolved Problems:    Troponin I above reference range POA: Unknown    CA (acute kidney injury) (HCC) POA: Unknown    Acute respiratory failure with hypoxia (HCC) POA: Yes    Dyspnea POA: Yes      FOLLOW UP  No future appointments.  Thelma Ferrell M.D.  [address]  [facility] [id number] [phone]    Follow up  Dr. Thelma Ferrell's office will be reaching out to schedule you a follow up appointment. If you do not hear from them in 3 days please call their office.    Ladonna Sherman D.O.  [facility]  [address] [place] [address]# [phone]  Go on 11/16/2022  Please go to your follow up appointment with Ladonna Sherman D.O. on Wednesday November 16,2022 check in at 3:30pm for a 3:45pm appointment.      MEDICATIONS ON DISCHARGE     Medication List        START taking these medications        Instructions   losartan 25 MG Tabs  Commonly known as: COZAAR   Take 1 Tablet by mouth every day.  Dose: 25 mg     metoprolol SR 25 MG Tb24  Start taking on: October 25, 2022  Commonly known as: TOPROL XL   Take 1 Tablet by mouth every day.  Dose: 25 mg     spironolactone 25 MG Tabs  Start taking on: October 25, 2022  Commonly known as: ALDACTONE   Take 1 Tablet by mouth every day.  Dose: 25 mg            CONTINUE taking these medications        Instructions   apixaban 5mg Tabs  Commonly known as: ELIQUIS   Take 5 mg by mouth 2 times a day.  Dose: 5 mg     furosemide 20 MG Tabs  Commonly known as: LASIX   Take 20 mg by mouth every day.  Dose: 20 mg     MOVE FREE PO   Take 1 Tablet by mouth every day.  Dose: 1 Tablet     multivitamin Tabs   Take 1 Tablet by mouth every day.  Dose: 1 Tablet     potassium chloride SA 20 MEQ Tbcr  Commonly known as: Kdur   Take 20 mEq by mouth every  day.  Dose: 20 mEq     PRESERVISION AREDS 2 PO   Take 1 Tablet by mouth 2 times a day.  Dose: 1 Tablet     SandoSTATIN LAR [place] 30 MG Kit  Generic drug: octreotide acetate lar   Inject 30 mg into the shoulder, thigh, or buttocks one time. Every 4 weeks  Dose: 30 mg     VITAMIN D PO   Take 1 Tablet by mouth every day.  Dose: 1 Tablet              Allergies  No Known Allergies    DIET  Orders Placed This Encounter   Procedures    Diet Order Diet: Cardiac     Standing Status:   Standing     Number of Occurrences:   1     Order Specific Question:   Diet:     Answer:   Cardiac [6]       ACTIVITY  As tolerated.  Weight bearing as tolerated    CONSULTATIONS  None    PROCEDURES  None    LABORATORY  Lab Results   Component Value Date    SODIUM 136 10/24/2022    POTASSIUM 3.8 10/24/2022    CHLORIDE 101 10/24/2022    CO2 23 10/24/2022    GLUCOSE 114 (H) 10/24/2022    BUN 24 (H) 10/24/2022    CREATININE 1.11 10/24/2022        Lab Results   Component Value Date    WBC 7.7 10/23/2022    HEMOGLOBIN 15.4 10/23/2022    HEMATOCRIT 45.7 10/23/2022    PLATELETCT 226 10/23/2022        Total time of the discharge process exceeds 39 minutes.

## 2022-11-15 PROBLEM — E11.9 TYPE 2 DIABETES MELLITUS, WITHOUT LONG-TERM CURRENT USE OF INSULIN (HCC): Status: ACTIVE | Noted: 2022-11-15

## 2022-11-15 NOTE — DOCUMENTATION QUERY
"                                                                         [facility]                                                                       Query Response Note      PATIENT:               JULIANE CLARK  ACCT #:                  [id number]  MRN:                     [id number]  :                      1934  ADMIT DATE:       10/22/2022 8:46 AM  DISCH DATE:        10/24/2022 12:02 PM  RESPONDING  PROVIDER #:        [id number]           QUERY TEXT:    Please clarify the clinical relevance for the A1c findings stated below: 6.4.    Thank You,  Liz Fernandes, RACHEL de [email or web address]    The patient's clinical indicators include:  Patient reported that he was told by his primary care provider that he was \"starting to be diabetic\" but had not been started on any medication.  His A1c was 6.4 here in the hospital.  I advised him to follow-up with his primary care provider for consideration of an SGLT2 for diabetes management and heart failure.    Glycohemoglobin 4.0 - 5.6 % 6.4 High   6.9 High  R, CM  6.8 High  R, CM  6.       Treatment: Outpatient follow-up    Related risks: CHF, mitral and tricuspid valve disorders, third degree heart block  Options provided:   -- Clinically relevant (please specify)   -- Insignificant findings (please document in your next progress note)   -- Clinically unable to be further specified   -- Other (please specify in the medical record)   -- Unknown      Query created by: Liz Fernandes on 11/15/2022 11:53 AM    RESPONSE TEXT:    Clinically relevant (please specify)          Electronically signed by:  TANYA ARIAS MD 11/15/2022 11:59 AM              "

## 2022-11-15 NOTE — DOCUMENTATION QUERY
"                                                                         [facility]                                                                       Query Response Note      PATIENT:               JULIANE CLARK  ACCT #:                  [id number]  MRN:                     [id number]  :                      1934  ADMIT DATE:       10/22/2022 8:46 AM  DISCH DATE:        10/24/2022 12:02 PM  RESPONDING  PROVIDER #:        [id number]           QUERY TEXT:    Please clarify the clinical relevance for the A1c findings stated below: 6.4.    Thank You,  Liz Fernandes, RACHEL de [email or web address]    The patient's clinical indicators include:  Patient reported that he was told by his primary care provider that he was \"starting to be diabetic\" but had not been started on any medication.  His A1c was 6.4 here in the hospital.  I advised him to follow-up with his primary care provider for consideration of an SGLT2 for diabetes management and heart failure.    Glycohemoglobin 4.0 - 5.6 % 6.4 High   6.9 High  R, CM  6.8 High  R, CM  6.       Treatment: Outpatient follow-up    Related risks: CHF, mitral and tricuspid valve disorders, third degree heart block  Options provided:   -- Clinically relevant (please specify)   -- Insignificant findings (please document in your next progress note)   -- Clinically unable to be further specified   -- Other (please specify in the medical record)   -- Unknown      Query created by: Liz Fernandes on 11/10/2022 2:19 PM    RESPONSE TEXT:    Clinically relevant (please specify)          Electronically signed by:  TANYA ARIAS MD 11/15/2022 9:35 AM              "
